# Patient Record
Sex: FEMALE | Race: WHITE | Employment: STUDENT | URBAN - METROPOLITAN AREA
[De-identification: names, ages, dates, MRNs, and addresses within clinical notes are randomized per-mention and may not be internally consistent; named-entity substitution may affect disease eponyms.]

---
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
## 2023-10-02 ENCOUNTER — TELEPHONE (OUTPATIENT)
Facility: CLINIC | Age: 23
End: 2023-10-02

## 2023-10-02 NOTE — TELEPHONE ENCOUNTER
The patient's father and his partner (Al DESIDanny, a patient of mine) have requested a GI visit for this patient. GI RNs: Please contact the patient. I would be happy to see her on Monday, October 9 at 3:30 PM at [facility]

## 2023-10-03 NOTE — TELEPHONE ENCOUNTER
Received a call back from the patient, /name verified. She agreed to the OV schedule provided.     10/9/2023 at 3:30 pm

## 2023-10-04 NOTE — TELEPHONE ENCOUNTER
Called and spoke to the patient, /name verified. The patient will come at 3:20 pm on 10/9/2023. Your appointments       Date & Time Appointment Department [facility])    Oct 09, 2023  3:20 PM CDT Consult with Susan Vincent MD [address], [address] [facility],[address], [place] ([facility])    Please bring in any pertinent lab or diagnostic test results with you to your appointment.             Peg Trimble, [address]  [address] [place] [address],[address]  [phone]

## 2023-10-09 ENCOUNTER — OFFICE VISIT (OUTPATIENT)
Facility: CLINIC | Age: 23
End: 2023-10-09

## 2023-10-09 VITALS
SYSTOLIC BLOOD PRESSURE: 115 MMHG | HEIGHT: 64 IN | HEART RATE: 87 BPM | WEIGHT: 145 LBS | BODY MASS INDEX: 24.75 KG/M2 | DIASTOLIC BLOOD PRESSURE: 68 MMHG

## 2023-10-09 DIAGNOSIS — R10.84 GENERALIZED ABDOMINAL PAIN: ICD-10-CM

## 2023-10-09 DIAGNOSIS — R19.7 DIARRHEA, UNSPECIFIED TYPE: Primary | ICD-10-CM

## 2023-10-09 DIAGNOSIS — A04.8 H. PYLORI INFECTION: ICD-10-CM

## 2023-10-09 PROCEDURE — 3078F DIAST BP <80 MM HG: CPT | Performed by: INTERNAL MEDICINE

## 2023-10-09 PROCEDURE — 3008F BODY MASS INDEX DOCD: CPT | Performed by: INTERNAL MEDICINE

## 2023-10-09 PROCEDURE — 99204 OFFICE O/P NEW MOD 45 MIN: CPT | Performed by: INTERNAL MEDICINE

## 2023-10-09 PROCEDURE — 3074F SYST BP LT 130 MM HG: CPT | Performed by: INTERNAL MEDICINE

## 2023-10-09 RX ORDER — LEVOCETIRIZINE DIHYDROCHLORIDE 5 MG/1
TABLET, FILM COATED ORAL
COMMUNITY

## 2023-10-09 RX ORDER — NORETHINDRONE ACETATE AND ETHINYL ESTRADIOL, ETHINYL ESTRADIOL AND FERROUS FUMARATE 1MG-10(24)
1 KIT ORAL DAILY
COMMUNITY
Start: 2023-09-18

## 2023-10-09 RX ORDER — SPIRONOLACTONE 100 MG/1
100 TABLET, FILM COATED ORAL DAILY
COMMUNITY
Start: 2023-06-15

## 2023-10-10 NOTE — PATIENT INSTRUCTIONS
1.  Schedule CT enterography. 2.  H. pylori breath test at your convenience. 3.  Discontinue sugarless gum for 1 week and assess symptoms.

## 2023-10-10 NOTE — PROGRESS NOTES
Subjective:   Patient ID: Harsha Gambino is a 26year old female. KAHLIL Lane is self referred on the advice of her father's partner (Trino Coffman. ..). She is seen today with her mother. Scott Randhawa has states that she was entirely asymptomatic with the exception of fecal urgency (normal stools once daily) which was controllable until 2023 when while living in [place] and subsequently traveling to [place] she awoke with the sudden onset of severe abdominal pain and diarrhea. She was treated with an antibiotic along with a probiotic for 2 weeks. Symptoms initially improved but recurred in May. Since that time the patient has ongoing diarrhea consisting of 8-9 stools daily that are typically soft or watery. There is associated abdominal pressure which can make her \"cry\" at night. The symptoms can awaken her from sleep. Fecal urgency continues and now is almost immediate resulting in episodes of incontinence. The patient was evaluated [place] Juanito Colbert [place], [place] [facility] She underwent a colonoscopy in 2023 which was normal to the terminal ileum with the exception of a cobblestone appearance to the rectum that was felt to be preparation induced. Random biopsies of the colon and biopsies the rectum were unremarkable. Dr. Guadalupe Carlos felt that her symptoms were due to anxiety and IBS and recommended pharmacologic therapy. The patient has subsequently seen Valerie Do DC a functional medicine physician. She has had the following pertinent evaluation:    1. Negative GI PCR panel including C. difficile  2. Positive H. pylori antigen test of the stool. 3.  Microbiome analysis  4. Normal fecal calprotectin (9) (less [place] 30 [place] [facility] [place]  5. Normal fecal elastase (750)  6. Negative stool for occult blood  7. Negative antigliadin IgA, however, the patient's secretory IgA level was low at 320    Additional testin. Normal CMP  2. Normal CBC (elevated RBC count)  3. Negative COMPA  4. ESR 2    The patient was treated for the H. pylori. A tetracycline regimen was initially used but unable to be tolerated (took for 10 days). She was subsequently treated with a 2-week course of amoxicillin, omeprazole and metronidazole at the end of July. This did not influence her symptoms. She is currently taking Pyloricil. Confirmation of eradication has not been obtained. Suraj Triplett continues to experience mid to lower abdominal pain/pressure most of the time, worse at certain times such as nighttime which can \"make her cry\". Bowel movements continue as described above. She notes \"insane\" bloating, frequent heartburn described as burning in the chest.  Mylanta will help for approximately 10 minutes. She also feels that the right side of her body is very sensitive and that overall she feels inflamed. Her mother feels that she \"looks different\" and \"bloated\". The patient has frequent nausea. She can vomit a few times monthly which can occur immediately after eating. She describes globus but no dysphagia. Heartburn occurs in the evenings. She also notes visible bloating and provides a cell phone photograph to document this. She notes that her stools can be black or red in color. There are no definite dietary precipitating factors. Of note the patient chews large quantities of sugarless gum which she has done so chronically. Past medical history:  1. Recurrent sinusitis  2. Adenoidectomy    Medications:  Oral contraceptives  Supplements  Spironolactone x3 years for acne  Prebiotic  Pyloricil  Vitamin B12  No frequent NSAIDs  One quarter of an Imodium tablet will result in being \"stopped up and bloated like death\". The following day diarrhea will resume.   The patient has not taken antispasmodics or a longer term PPI course    Social history:  Non-smoker  No alcohol  No marijuana  Occasional caffeine    Family history:  Negative for gastrointestinal syndromes or cancers      History/Other:   Review of Systems  See above    Wt Readings from Last 7 Encounters:  10/09/23 : 145 lb (65.8 kg)  08/03/20 : 141 lb (64 kg) (73%, Z= 0.63)*  04/25/19 : 140 lb (63.5 kg) (76%, Z= 0.72)*  02/18/19 : 135 lb (61.2 kg) (71%, Z= 0.55)*  12/04/17 : 144 lb (65.3 kg) (83%, Z= 0.96)*  05/02/17 : 145 lb (65.8 kg) (85%, Z= 1.04)*    * Growth percentiles are based on [facility] (Girls, 2-20 Years) data. Current Outpatient Medications   Medication Sig Dispense Refill    AZELASTINE HCL NA azelastine 137 mcg (0.1 %) nasal spray aerosol   U 1 SPR NSALLY BID      levocetirizine 5 MG Oral Tab levocetirizine 5 mg tablet   TAKE 1 TABLET BY MOUTH EVERY NIGHT AT BEDTIME      LO LOESTRIN FE 1 MG-10 MCG / 10 MCG Oral Tab Take 1 tablet by mouth daily. spironolactone 100 MG Oral Tab Take 1 tablet (100 mg total) by mouth daily. Desogestrel-Ethinyl Estradiol (VIORELE) 0.15-0.02/0.01 MG (21/5) Oral Tab Take 1 tablet by mouth daily. 84 tablet 3    Probiotic Product (PROBIOTIC OR) Take by mouth as needed. Acetaminophen (MIDOL OR) Take by mouth as needed. (Patient not taking: Reported on 10/9/2023)      IBUPROFEN OR as needed. (Patient not taking: Reported on 10/9/2023)       Allergies:  Charentais Melon (F*    SWELLING  Honeydew                TONGUE SWELLING  Red Dye                 TONGUE SWELLING  Ibuprofen               UNKNOWN  Olive Oil               UNKNOWN    Comment:Black olives  Dust Mite Extract       OTHER (SEE COMMENTS), ITCHING    Objective:   Physical Exam  Vitals and nursing note reviewed. Constitutional:       General: She is not in acute distress. Appearance: She is well-developed. She is not ill-appearing, toxic-appearing or diaphoretic. Comments: Appears healthy   HENT:      Head: Normocephalic and atraumatic. Mouth/Throat:      Pharynx: No oropharyngeal exudate. Eyes:      General: No scleral icterus.      Conjunctiva/sclera: Conjunctivae normal.   Neck:      Thyroid: No thyromegaly. Cardiovascular:      Rate and Rhythm: Normal rate and regular rhythm. Heart sounds: Normal heart sounds. Pulmonary:      Effort: Pulmonary effort is normal. No respiratory distress. Breath sounds: Normal breath sounds. No wheezing or rales. Abdominal:      General: Bowel sounds are normal. There is no distension. Palpations: Abdomen is soft. There is no mass. Tenderness: There is abdominal tenderness (Diffuse direct tenderness to palpation). There is no guarding or rebound. Musculoskeletal:      Cervical back: Neck supple. Lymphadenopathy:      Cervical: No cervical adenopathy. Neurological:      Mental Status: She is alert and oriented to person, place, and time. Psychiatric:         Behavior: Behavior normal.         Assessment & Plan:   1. Diarrhea, unspecified type  The patient presents with the abrupt onset of abdominal pain and diarrhea in February 2023 while in [place]. Work-up to date has included a negative GI PCR panel and a normal ileocolonoscopy. Fecal calprotectin determinations have been normal as has a fecal elastase. Sprue serology was negative, however, the patient's IgA level is low. We discussed other possibilities. 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 There may be a component of sorbitol induced symptoms as well. Small intestinal bacterial overgrowth is also a possibility although the patient did not respond to antibiotics. The patient will be contacted regarding obtaining sprue serology (IgA, IgG and a TTG IgG antibody). She will stop the sorbitol containing gum for 1 week and assess symptoms. In light of the refractory symptoms I am recommending that we obtain an enterography. We discussed CT versus MRI and have elected the former. We also discussed the option of an upper endoscopy and small bowel biopsy.   This would be mandatory if sprue serology is abnormal.  If the above work-up is negative options include an antispasmodic or a low-dose tricyclic. I would not use Viberzi or Lotronex as very low doses of Imodium resulted in constipation. - CT ENTEROGRAPHY ABD/PEL W CONTRAST (CPT=74177); Future    2. Generalized abdominal pain  See above    - CT ENTEROGRAPHY ABD/PEL W CONTRAST (CPT=74177); Future    3. H. pylori infection  Eradication should be confirmed. We discussed antigen testing versus breath testing and have elected the latter. - Helicobacter Pylori Breath Test, Adult;  Future    Orders Placed This Encounter      Helicobacter Pylori Breath Test, Adult      Tissue Transglutaminase AB IgG      Tissue Transglutaminase Ab, IgA      Immunoglobulin A/G/M, Quant      Meds This Visit:  Requested Prescriptions      No prescriptions requested or ordered in this encounter       Imaging & Referrals:  CT ENTEROGRAPHY ABD/PEL W CONTRAST (CPT=74177)

## 2023-10-23 ENCOUNTER — PATIENT MESSAGE (OUTPATIENT)
Facility: CLINIC | Age: 23
End: 2023-10-23

## 2023-10-23 ENCOUNTER — TELEPHONE (OUTPATIENT)
Facility: CLINIC | Age: 23
End: 2023-10-23

## 2023-10-23 NOTE — TELEPHONE ENCOUNTER
From: Hakeem Singer  To: Ciera Tan  Sent: 10/23/2023 12:59 PM CDT  Subject: Blood in stool    Hi Dr. Capri Trivedi,     This past weekend I have noticed large clots of blood in my stool which is something that I have not experienced before. Normally it looks like small pieces of blood in my stool but this time it was larger clots. I just wanted to let you know this and I wanted to know if this was something to be more concerned about.

## 2023-10-25 ENCOUNTER — HOSPITAL ENCOUNTER (OUTPATIENT)
Dept: CT IMAGING | Facility: HOSPITAL | Age: 23
Discharge: HOME | End: 2023-10-25
Attending: INTERNAL MEDICINE

## 2023-10-25 DIAGNOSIS — R10.84 GENERALIZED ABDOMINAL PAIN: ICD-10-CM

## 2023-10-25 DIAGNOSIS — R19.7 DIARRHEA, UNSPECIFIED TYPE: ICD-10-CM

## 2023-10-25 LAB
CREAT BLD-MCNC: 0.7 MG/DL
EGFRCR SERPLBLD CKD-EPI 2021: 125 ML/MIN/1.73M2 (ref 60–?)

## 2023-10-25 PROCEDURE — 82565 ASSAY OF CREATININE: CPT

## 2023-10-25 PROCEDURE — 74177 CT ABD & PELVIS W/CONTRAST: CPT | Performed by: INTERNAL MEDICINE

## 2023-10-26 ENCOUNTER — PATIENT MESSAGE (OUTPATIENT)
Facility: CLINIC | Age: 23
End: 2023-10-26

## 2023-10-27 ENCOUNTER — TELEPHONE (OUTPATIENT)
Facility: CLINIC | Age: 23
End: 2023-10-27

## 2023-10-27 NOTE — TELEPHONE ENCOUNTER
Patient calling regards states would want to know what Dr recommendations aware based on CT results before she leaves 4 hrs away for school today. Please call and advise.

## 2023-10-27 NOTE — TELEPHONE ENCOUNTER
From: Heather Reyes  To: Bisi Roperman  Sent: 10/26/2023 9:23 PM CDT  Subject: Follow up     Hi there,   I was planning on heading back to school tomorrow but I wanted to check with Dr. Raul Pinto before heading back in case he wanted to see me after my CT results.

## 2023-10-27 NOTE — TELEPHONE ENCOUNTER
Dr Tiffanie Penny and spoke to the patient, /name verified. CT enterography completed 10/25/2023. As per the patient, she is doing ok. She wants Ct results reviewed w/ her. She was informed that you are out of the office, returning 2023    The patient understood.

## 2023-11-12 ENCOUNTER — PATIENT MESSAGE (OUTPATIENT)
Facility: CLINIC | Age: 23
End: 2023-11-12

## 2023-11-12 DIAGNOSIS — R93.2 ABNORMAL CT OF LIVER: Primary | ICD-10-CM

## 2023-11-12 DIAGNOSIS — R19.7 DIARRHEA, UNSPECIFIED TYPE: ICD-10-CM

## 2023-11-13 NOTE — TELEPHONE ENCOUNTER
Dr Hayden Vargas enterography resulted, available in [facility] Please see MyChart message from the patient.     Thank you

## 2023-11-14 NOTE — TELEPHONE ENCOUNTER
Please see 11/12/2023 [employer] message with documentation from 11/13/2023: I spoke to Aixa So King Medhat. Fortunately the CT scan was negative for inflammatory bowel disease. Her GI symptoms continue albeit slightly better on a probiotic and prebiotic with decreased stool frequency to 4-5 daily. The abnormality in the liver is likely benign, however, I would recommend a liver ultrasound as the next step which was ordered. She will obtain the sprue serology when able. She is concerned about the possibility of liver flukes based on her GI symptoms and the abnormality on CT. She has traveled abroad. Although this would be unlikely I am recommending an ova and parasite examination which was ordered as well. Further recommendations pending the above results.

## 2023-11-14 NOTE — TELEPHONE ENCOUNTER
I spoke to [address] [facility]. Fortunately the CT scan was negative for inflammatory bowel disease. Her GI symptoms continue albeit slightly better on a probiotic and prebiotic with decreased stool frequency to 4-5 daily. The abnormality in the liver is likely benign, however, I would recommend a liver ultrasound as the next step which was ordered. She will obtain the sprue serology when able. She is concerned about the possibility of liver flukes based on her GI symptoms and the abnormality on CT. She has traveled abroad. Although this would be unlikely I am recommending an ova and parasite examination which was ordered as well. Further recommendations pending the above results.

## 2023-11-21 ENCOUNTER — PATIENT MESSAGE (OUTPATIENT)
Facility: CLINIC | Age: 23
End: 2023-11-21

## 2023-11-22 NOTE — TELEPHONE ENCOUNTER
Paul Bonilla    Please advise on message below. Unable to advise to take 2 imodium, easily constipated.     Thank you

## 2023-11-22 NOTE — TELEPHONE ENCOUNTER
From: Carisa Valdez  To: Iván Dela Cruz  Sent: 11/21/2023 11:01 PM CST  Subject: medicine     Hi Dr. Lauryn Pinto! 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 I was wondering if there was a super strength anti diarrhea medicine that would help or if imodium is the best. Let me know what you think!

## 2023-11-28 ENCOUNTER — PATIENT MESSAGE (OUTPATIENT)
Facility: CLINIC | Age: 23
End: 2023-11-28

## 2023-11-29 ENCOUNTER — TELEPHONE (OUTPATIENT)
Facility: CLINIC | Age: 23
End: 2023-11-29

## 2023-11-29 NOTE — TELEPHONE ENCOUNTER
From: Constance Shaikh  To: Eleanor Colbert  Sent: 11/28/2023 2:13 PM CST  Subject: Ultrasound     Hi there! I was wondering if was possible for me to do the ultrasound at another location. [facility] is booked except for a few appointments that are at 7am which would be hard for me since my stomach problems are so bad in the morning. I was hoping maybe I could try other locations that would have an appointment later in the day.

## 2023-11-29 NOTE — TELEPHONE ENCOUNTER
I called and spoke to the patient, /name verified. She is unable to schedule for the US liver, available schedule is towards the end of 2023. I advised the patient to schedule and GI will call [address].

## 2023-12-04 ENCOUNTER — TELEPHONE (OUTPATIENT)
Facility: CLINIC | Age: 23
End: 2023-12-04

## 2023-12-04 NOTE — TELEPHONE ENCOUNTER
1st,overdue reminder letter mailed out to patient   Labs and Imaging order :  Helicobacter Pylori Breath Test, Adult  Immunoglobulin A/G/M, Quant  Tissue Transglutaminase AB IgG  Tissue Transglutaminase Ab, IgA  Ova and Parasites, traveler outside of country (Order #[id number]) on 11/13/23     Imaging order:  US LIVER (CPT=76705) (Order #[id number]) on 11/13/23

## 2023-12-11 ENCOUNTER — NURSE ONLY (OUTPATIENT)
Dept: LAB | Facility: REFERENCE LAB | Age: 23
End: 2023-12-11
Attending: INTERNAL MEDICINE
Payer: COMMERCIAL

## 2023-12-11 DIAGNOSIS — A04.8 H. PYLORI INFECTION: ICD-10-CM

## 2023-12-11 PROCEDURE — 83013 H PYLORI (C-13) BREATH: CPT

## 2023-12-12 LAB — H PYLORI BREATH TEST: NEGATIVE

## 2023-12-13 NOTE — TELEPHONE ENCOUNTER
Called and spoke to the patient, /name verified. She would like to keep the ultrasound schedule. I notified her of the H pylori result which was negative. The patient was grateful.

## 2023-12-21 ENCOUNTER — HOSPITAL ENCOUNTER (OUTPATIENT)
Dept: ULTRASOUND IMAGING | Age: 23
Discharge: HOME | End: 2023-12-21
Attending: INTERNAL MEDICINE
Payer: COMMERCIAL

## 2023-12-21 DIAGNOSIS — R93.2 ABNORMAL CT OF LIVER: ICD-10-CM

## 2023-12-21 PROCEDURE — 76705 ECHO EXAM OF ABDOMEN: CPT | Performed by: INTERNAL MEDICINE

## 2023-12-22 ENCOUNTER — TELEPHONE (OUTPATIENT)
Facility: CLINIC | Age: 23
End: 2023-12-22

## 2024-01-03 ENCOUNTER — PATIENT MESSAGE (OUTPATIENT)
Facility: CLINIC | Age: 24
End: 2024-01-03

## 2024-01-03 ENCOUNTER — TELEPHONE (OUTPATIENT)
Facility: CLINIC | Age: 24
End: 2024-01-03

## 2024-01-03 DIAGNOSIS — K62.5 RECTAL BLEEDING: Primary | ICD-10-CM

## 2024-01-03 NOTE — TELEPHONE ENCOUNTER
Pt states that she is still getting blood in stool.  Stools have been very dark.  Pt is going to lab test tomorrow and is wanting to know if anything needs to be added to this order.  Please call

## 2024-01-03 NOTE — TELEPHONE ENCOUNTER
Yes, I would obtain a CBC and iron studies.  Please make sure that the patient instructs the lab to look up all outstanding orders as there will be 2 orders to be done (the orders entered today and the sprue serology entered previously).

## 2024-01-03 NOTE — TELEPHONE ENCOUNTER
Dr Myers    Called and spoke to the patient, /name verified.    She stated she will go to the lab tomorrow to complete the tests.    She is concerned, Hgb might be dropping. She reported approximately 10 separate episodes of small dark red blood clots mixed w/ stool. She denies toilet bowl turning red due to blood.    She has allergy to red dye, does not take anything red.  No menstrual period.  She feels tired easily, denies dizziness, fever and sob.    Appetite fair.    Advised the patient if bleeding is sever, she feels weak, dizzy and sob, she needs to be evaluated in ED.  The patient verbalized understanding.    Thank you

## 2024-01-03 NOTE — TELEPHONE ENCOUNTER
Called and spoke to the patient, / name verified.    The patient was informed of total 5 blood tests to be completed and 1 stool test.    She verbalized understanding.

## 2024-01-04 ENCOUNTER — LAB ENCOUNTER (OUTPATIENT)
Dept: LAB | Age: 24
End: 2024-01-04
Attending: INTERNAL MEDICINE
Payer: COMMERCIAL

## 2024-01-04 DIAGNOSIS — R19.7 DIARRHEA, UNSPECIFIED TYPE: ICD-10-CM

## 2024-01-04 DIAGNOSIS — K62.5 RECTAL BLEEDING: ICD-10-CM

## 2024-01-04 LAB
BASOPHILS # BLD AUTO: 0.06 X10(3) UL (ref 0–0.2)
BASOPHILS NFR BLD AUTO: 0.7 %
DEPRECATED HBV CORE AB SER IA-ACNC: 7.1 NG/ML
DEPRECATED RDW RBC AUTO: 41.6 FL (ref 35.1–46.3)
EOSINOPHIL # BLD AUTO: 0.1 X10(3) UL (ref 0–0.7)
EOSINOPHIL NFR BLD AUTO: 1.2 %
ERYTHROCYTE [DISTWIDTH] IN BLOOD BY AUTOMATED COUNT: 13.3 % (ref 11–15)
HCT VFR BLD AUTO: 44.3 %
HGB BLD-MCNC: 13.9 G/DL
IGA SERPL-MCNC: 152.6 MG/DL (ref 40–350)
IGM SERPL-MCNC: 131.2 MG/DL (ref 50–300)
IMM GRANULOCYTES # BLD AUTO: 0.02 X10(3) UL (ref 0–1)
IMM GRANULOCYTES NFR BLD: 0.2 %
IMMUNOGLOBULIN PNL SER-MCNC: 946 MG/DL (ref 650–1600)
IRON SATN MFR SERPL: 28 %
IRON SERPL-MCNC: 132 UG/DL
LYMPHOCYTES # BLD AUTO: 2.28 X10(3) UL (ref 1–4)
LYMPHOCYTES NFR BLD AUTO: 27.2 %
MCH RBC QN AUTO: 27 PG (ref 26–34)
MCHC RBC AUTO-ENTMCNC: 31.4 G/DL (ref 31–37)
MCV RBC AUTO: 86.2 FL
MONOCYTES # BLD AUTO: 0.57 X10(3) UL (ref 0.1–1)
MONOCYTES NFR BLD AUTO: 6.8 %
NEUTROPHILS # BLD AUTO: 5.34 X10 (3) UL (ref 1.5–7.7)
NEUTROPHILS # BLD AUTO: 5.34 X10(3) UL (ref 1.5–7.7)
NEUTROPHILS NFR BLD AUTO: 63.9 %
PLATELET # BLD AUTO: 283 10(3)UL (ref 150–450)
RBC # BLD AUTO: 5.14 X10(6)UL
TIBC SERPL-MCNC: 472 UG/DL (ref 250–425)
TRANSFERRIN SERPL-MCNC: 317 MG/DL (ref 250–380)
WBC # BLD AUTO: 8.4 X10(3) UL (ref 4–11)

## 2024-01-04 PROCEDURE — 85025 COMPLETE CBC W/AUTO DIFF WBC: CPT

## 2024-01-04 PROCEDURE — 84466 ASSAY OF TRANSFERRIN: CPT

## 2024-01-04 PROCEDURE — 87177 OVA AND PARASITES SMEARS: CPT

## 2024-01-04 PROCEDURE — 86364 TISS TRNSGLTMNASE EA IG CLAS: CPT

## 2024-01-04 PROCEDURE — 82728 ASSAY OF FERRITIN: CPT

## 2024-01-04 PROCEDURE — 83540 ASSAY OF IRON: CPT

## 2024-01-04 PROCEDURE — 87209 SMEAR COMPLEX STAIN: CPT

## 2024-01-04 PROCEDURE — 82784 ASSAY IGA/IGD/IGG/IGM EACH: CPT

## 2024-01-04 PROCEDURE — 36415 COLL VENOUS BLD VENIPUNCTURE: CPT

## 2024-01-04 NOTE — TELEPHONE ENCOUNTER
From: Ariana Gamboa  To: Louis Burrell  Sent: 1/3/2024 4:57 PM CST  Subject: Lab work     here is the lab work

## 2024-01-05 ENCOUNTER — PATIENT MESSAGE (OUTPATIENT)
Facility: CLINIC | Age: 24
End: 2024-01-05

## 2024-01-05 LAB
TTG IGA SER-ACNC: 0.3 U/ML (ref ?–7)
TTG IGG SER-ACNC: <0.6 U/ML (ref ?–7)

## 2024-01-05 NOTE — TELEPHONE ENCOUNTER
From: Ariana Gamboa  To: Louis Burrell  Sent: 1/5/2024 12:43 PM CST  Subject: Bloodwork    I wanted to let Dr. Burrell know that I got my bloodwork done yesterday and my stool sample. I am heading back to school on Sunday so just have Dr. Burrell call me if he needs to see me.

## 2024-01-06 NOTE — TELEPHONE ENCOUNTER
I spoke to Ariana.  She is feeling significantly better with regards to her diarrhea.  She now has #5 stools daily as opposed to 10 or more.  Abdominal bloating and pressure have not completely resolved but are better.  She has been experiencing intermittent rectal bleeding both red and dark in the stool.  Her hemoglobin fortunately is normal.  Iron saturation is normal, however, ferritin is 7.1.  She is on daily OCPs and has not had a menstrual period for 3 years.  Sprue serology was negative and a previously low IgA level has normalized.  She has had a colonoscopy within the past several months along with normal fecal calprotectin determinations.  Although I doubt inflammatory bowel disease she will monitor and chart her bleeding over the next 2 weeks.  She will be traveling to  but can come back if needed.  She will send me a MyChart message with the aforementioned diary.  If the bleeding episodes continue we will contemplate either a flexible sigmoidoscopy or repeat colonoscopy.  She does not feel that she requires antispasmodics at this time.  She was very appreciative of the call.

## 2024-02-09 ENCOUNTER — PATIENT MESSAGE (OUTPATIENT)
Facility: CLINIC | Age: 24
End: 2024-02-09

## 2024-02-09 NOTE — TELEPHONE ENCOUNTER
From: Ariana Gamboa  To: Louis Burrell  Sent: 2/9/2024 10:12 AM CST  Subject: Blood in Stool    Hi Doctor Indra.     Since our last call, I have been monitoring blood in my stool. I wouldn’t say that it happens every time I have a bowl movement but it definitely is happening often. I have also noticed it a lot when I wipe, but it is mostly dark and in my stool.

## 2024-02-09 NOTE — TELEPHONE ENCOUNTER
Dr Myers    The patient sent a diary of bloody stools.    Please see attachment from the [employer] message    Thank you

## 2024-02-09 NOTE — TELEPHONE ENCOUNTER
Called and spoke to the patient, /name verified.    She will send in the MyChart her diary of the bleeding.

## 2024-02-12 ENCOUNTER — TELEPHONE (OUTPATIENT)
Facility: CLINIC | Age: 24
End: 2024-02-12

## 2024-02-12 DIAGNOSIS — K62.5 RECTAL BLEEDING: Primary | ICD-10-CM

## 2024-02-12 RX ORDER — DEXTROAMPHETAMINE SACCHARATE, AMPHETAMINE ASPARTATE, DEXTROAMPHETAMINE SULFATE AND AMPHETAMINE SULFATE 2.5; 2.5; 2.5; 2.5 MG/1; MG/1; MG/1; MG/1
10 TABLET ORAL AS NEEDED
COMMUNITY

## 2024-02-12 NOTE — TELEPHONE ENCOUNTER
May schedule a flexible sigmoidoscopy for rectal bleeding with monitored anesthesia care or IV sedation.  Alyssaets enema preparation (at least #1).

## 2024-02-12 NOTE — TELEPHONE ENCOUNTER
Dr Louis HOWARD [facility] Clinical Staff (supporting Louis Burrell MD)3 days ago     MK  I definitely don’t want to get a colonoscopy unless it’s necessary so I think the other option is definitely better, it’s better to be safe then sorry I guess!       MD Ariana Barrosos3 days ago       Paul Lane,     Thank you for the update.     I doubt that the bleeding is due to a serious cause.  Bleeding from internal hemorrhoids is most likely, however, I cannot exclude inflammation in the colon or rectum.     We could consider a flexible sigmoidoscopy (shorter version of a colonoscopy without the extensive bowel preparation) to evaluate for any inflammation in the lower portion of the colon.  This would be performed with sedation.  I would like to avoid another colonoscopy if possible although this would be an additional option if you prefer.     Please let me know how you wish to proceed.     Dr. Burrell    Medications, pharmacy, and allergies verified with the patient.   Please advise on colonoscopy and bowel prep orders.     › Insurance:  [employer]  › Last PCP Visit:  › Last CBC drawn: 1/4/2024  › H/W/BMI:  5'4/ 150 lbs/ 25.74    Special comments/notes:    Telephone colon screening Questionnaires:  Yes No   Are you currently experiencing any new GI symptoms? [x] []   If yes, symptom details:     Rectal Bleeding with or without bowel movements: [x] []   Black stool: [] [x]   Dysphagia &Food feeling/getting stuck: [] [x]   Intractable Vomiting: [] [x]   Unexplained weight loss: [] [x]   Any issues with anesthesia? [] [x]   If yes, explain details:      Personal history of Resp. Issues/Oxygen Use/TERRY/COPD? [] [x]   If yes, CPAP/BiPAP? [] []   History of devices Pacemaker/Defibrillator/Stents? [] [x]   History of Cardiac/CVA issues/(MI/Stroke):  [] [x]     Medication usage:  Yes  No   Anticoagulants:  Anticoagulant (Except Aspirin) ? Route to RN staff to obtain ordering provider  orders [] [x]   Diabetic Meds:   PO DM Meds ? Hold day prior and day of procedure  Insulin ? Route to RN clinical staff to obtain provider orders  [] [x]   Weight loss meds (phentermine/Vyvanse/Saxsenda): [] [x]   Iron/Herbal/Multivitamin Supplement (RX/OTC): [] [x]   Usage of marijuana, CBD &/or vape products: [] [x]

## 2024-02-14 NOTE — TELEPHONE ENCOUNTER
Dr. Burrell-      Spoke with patient she goes to school in [place] and her only time she will be around is the week of march 10th. She is wondering if possible to see another provider- I did mention I  can schedule her in May after she is done with school and put her on your wait list as well if another provider won't work.    Please advise    Thanks!

## 2024-02-15 NOTE — TELEPHONE ENCOUNTER
Scheduled for:  [employer] Sig [address]  Provider Name:  Dr. Wong  Date:  3/15/2024  Location:  [facility]  Sedation:  MAC  Time:  2:45pm, pt is aware [facility] will call with arrival time  Prep:  Fleets  Meds/Allergies Reconciled?:  Physician reviewed     Diagnosis with codes:  rectal bleeding K62.5  Was patient informed to call insurance with codes (Y/N):  Yes, , I confirmed [employer] insurance with this patient.      Referral sent?:  Referral was sent at the time of electronic surgical scheduling.   [facility] or [facility] notified?:  I sent an electronic request to [facility] and received a confirmation today.      Medication Orders:  n/a  Misc Orders:  n/a     Further instructions given by staff:   I discussed the prep instructions with the patient which he verbally understood and is aware that I will mail the instructions today.

## 2024-03-15 ENCOUNTER — TELEPHONE (OUTPATIENT)
Facility: CLINIC | Age: 24
End: 2024-03-15

## 2024-03-15 NOTE — TELEPHONE ENCOUNTER
Received form signed by MD.    Faxed order to [facility] with face sheet, insurance info.    Signed form sent for scanning    Addendum to my charting below:  I asked the patient to call the office if she does not receive a call from the pharmacy.    She agreed and verbalized understanding.

## 2024-03-15 NOTE — TELEPHONE ENCOUNTER
GI RNs: Can you send in a prescription to the patient's pharmacy for topical nifedipine (compounded into gel form) 0.2%.  Sig: Apply a pea-sized quantity to the anus twice daily.  Dispense 1 month with 1 refill.

## 2024-03-15 NOTE — TELEPHONE ENCOUNTER
Dr Myers    Called and spoke to the patient, /name verified.    She agreed to send the nifedipine order to [facility] pharmacy in [place] as a mail order.    Form filled out, please review and sign.    Thank you

## 2024-04-15 ENCOUNTER — TELEPHONE (OUTPATIENT)
Facility: CLINIC | Age: 24
End: 2024-04-15

## 2024-04-15 NOTE — TELEPHONE ENCOUNTER
Patient calling states has questions regards parasites in stool/. States possibly might have infection feels as if she saw in her stool. Please call.

## 2024-04-15 NOTE — TELEPHONE ENCOUNTER
Dr Louis ENCARNACION  Called and spoke to the patient, /name verified.    She stated she noticed 4 whitish strands in her stools x 1, she could not tell if it was moving.    She denies fever, c/o diarrhea with mucus in stool, bloating.    Advised to monitor stools and let the office know if it recurs.    The patient agreed and verbalized understanding    Thank you.

## 2024-04-15 NOTE — TELEPHONE ENCOUNTER
[facility]    Mental Health Counseling Session Update - Protected Health Information        Date:  1-23-24       Virtual telehealth session with Ms. Chapa.       Total  Time:    60 min                            Progress:  Good       / Fair                 Compliant with Psych. Meds:  Yes      Therapeutic Modality:  Supportive    DBT             Factors regarding Medical treatment:   Multiple medical/ physical concerns/ limitations.              Changes (if any) to Initial Assessment:                      Treatment Modality:  Individual    Suicidal/ Homicidal Concerns:  No    Factors aiding or impeding psychotherapy progress:   came     Processed client's thoughts, feelings, and actions.  Focused on distress tolerance and interpersonal effectiveness.  Examined thought, behavioral, emotional, and relationship patterns.  Explored ideas, choices, resources, and decision-making.  Encouraged utilizing mindfulness and grounding techniques.  Provided support and empathetic understanding.            Next Sessions:  1-26-24  & 1-30-24     Kera Landry [facility]  Mental Health Counselor               Agree with triage/RN advice.

## 2024-06-03 ENCOUNTER — PATIENT MESSAGE (OUTPATIENT)
Facility: CLINIC | Age: 24
End: 2024-06-03

## 2024-06-03 DIAGNOSIS — R19.7 DIARRHEA, UNSPECIFIED TYPE: ICD-10-CM

## 2024-06-03 DIAGNOSIS — R14.0 BLOATING: Primary | ICD-10-CM

## 2024-06-03 DIAGNOSIS — R14.0 ABDOMINAL DISTENTION: ICD-10-CM

## 2024-06-04 NOTE — TELEPHONE ENCOUNTER
Dr Myers    Called and spoke to the patient, date of birth and name verified.    She reported still having loose stools daily, bloated after eating and abdomen feels distended.    She wants to get tested for SIBO at [facility], declined [facility].    Referral pended, please sign if ok.    Thank you

## 2024-06-05 NOTE — TELEPHONE ENCOUNTER
Faxed referral for lactulose hydrogen breath test to [facility] ([phone] F) ([phone] P)    Also faxed face sheet, insurance information, office visit from 10/9/2023.

## 2024-10-30 ENCOUNTER — APPOINTMENT (OUTPATIENT)
Dept: URGENT CARE | Age: 24
End: 2024-10-30

## 2024-10-30 ENCOUNTER — HOSPITAL ENCOUNTER (OUTPATIENT)
Age: 24
Discharge: HOME | End: 2024-10-30
Payer: COMMERCIAL

## 2024-10-30 VITALS
DIASTOLIC BLOOD PRESSURE: 70 MMHG | RESPIRATION RATE: 20 BRPM | SYSTOLIC BLOOD PRESSURE: 130 MMHG | TEMPERATURE: 98 F | HEART RATE: 89 BPM | OXYGEN SATURATION: 100 %

## 2024-10-30 DIAGNOSIS — M54.32 SCIATICA OF LEFT SIDE: Primary | ICD-10-CM

## 2024-10-30 PROCEDURE — 99203 OFFICE O/P NEW LOW 30 MIN: CPT | Performed by: NURSE PRACTITIONER

## 2024-10-30 NOTE — ED INITIAL ASSESSMENT (HPI)
Pt came in due to left leg pain for the past 2 weeks. Pt denies any injury trauma or fall. Pt stated pain starts from left ankle and radiates up the entire left leg up to the buttock. Pt stated pain gets worse when laying down flat or sitting on the toilet. Pt has easy non labored respirations.

## 2024-10-30 NOTE — ED PROVIDER NOTES
Patient Seen in: [facility]      History     Chief Complaint   Patient presents with    Leg Pain     Stated Complaint: Leg or Foot Injury    Subjective:   24-year-old female with history of multiple chronic medical problems, including anemia and GI problems presents with c/o leg pain X 2 weeks.  She states the pain worse with laying down or sitting. No pain with walking. No recent travel. Pain start at left buttocks and radiates down to left ankle. No swelling, no bruising, no warmth. No recent travel, no surgeries. She is on oral birth control.     The history is provided by the patient.             Objective:     History reviewed. No pertinent past medical history.           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   Tobacco Use    Smoking status: Never    Smokeless tobacco: Never   Vaping Use    Vaping status: Never Used   Substance and Sexual Activity    Alcohol use: No    Drug use: No    Sexual activity: Not Currently   Other Topics Concern    Exercise Yes     Comment: yoga     Social Drivers of Health     Financial Resource Strain: Low Risk  (9/18/2024)    Received from [facility]    Overall Financial Resource Strain (CARDIA)     Difficulty of Paying Living Expenses: Not hard at all   Food Insecurity: No Food Insecurity (9/18/2024)    Received from [facility]    Hunger Vital Sign     Worried About Running Out of Food in the Last Year: Never true     Ran Out of Food in the Last Year: Never true   Transportation Needs: No Transportation Needs (9/18/2024)    Received from [facility]    PRAPARE - Transportation     Lack of Transportation (Medical): No     Lack of Transportation (Non-Medical): No   Housing  Stability: Unknown (9/18/2024)    Received from [facility]    Housing Stability Vital Sign     Unable to Pay for Housing in the Last Year: No              Review of Systems    Positive for stated complaint: Leg or Foot Injury  Other systems are as noted in HPI.  Constitutional and vital signs reviewed.      All other systems reviewed and negative except as noted above.    Physical Exam     ED Triage Vitals [10/30/24 1432]   /70   Pulse 89   Resp 20   Temp 97.7 °F (36.5 °C)   Temp src Temporal   SpO2 100 %   O2 Device None (Room air)       Current Vitals:   Vital Signs  BP: 130/70  Pulse: 89  Resp: 20  Temp: 97.7 °F (36.5 °C)  Temp src: Temporal    Oxygen Therapy  SpO2: 100 %  O2 Device: None (Room air)        Physical Exam  Constitutional:       Appearance: Normal appearance.   Cardiovascular:      Rate and Rhythm: Normal rate and regular rhythm.      Pulses: Normal pulses.      Heart sounds: Normal heart sounds.   Pulmonary:      Effort: Pulmonary effort is normal.      Breath sounds: Normal breath sounds.   Musculoskeletal:         General: Normal range of motion.      Lumbar back: Tenderness (Left gluteal region) present. Negative right straight leg raise test.   Skin:     General: Skin is warm and dry.   Neurological:      Mental Status: She is alert and oriented to person, place, and time.   Psychiatric:         Mood and Affect: Mood normal.         Behavior: Behavior normal.             ED Course   Labs Reviewed - No data to display                MDM              Medical Decision Making  24-year-old female presented with history and exam consistent with sciatica.  On exam she has no swelling, warmth, redness, or tenderness to left leg so I have low concern for DVT.  She also has low risk factors for DVT.  No recent travel, no recent surgeries, no history of DVT.  Patient is well-appearing and vitals are stable.  She is comfortable with plan of discharge.  Discussed strict return  precautions with her if she develops any redness, swelling, warmth to leg.  She will follow-up with her primary care provider.        Disposition and Plan     Clinical Impression:  1. Sciatica of left side         Disposition:  Discharge  10/30/2024  3:15 pm    Follow-up:  Andre Lafleur  [facility] [facility] [address]  [phone]    Schedule an appointment as soon as possible for a visit             Medications Prescribed:  Discharge Medication List as of 10/30/2024  3:18 PM              Supplementary Documentation:

## 2025-04-25 ENCOUNTER — HOSPITAL ENCOUNTER (OUTPATIENT)
Dept: MRI IMAGING | Facility: HOSPITAL | Age: 25
Discharge: HOME | End: 2025-04-25
Attending: FAMILY MEDICINE
Payer: COMMERCIAL

## 2025-04-25 DIAGNOSIS — K76.9 LIVER LESION: ICD-10-CM

## 2025-04-25 PROCEDURE — 76376 3D RENDER W/INTRP POSTPROCES: CPT | Performed by: FAMILY MEDICINE

## 2025-04-25 PROCEDURE — A9575 INJ GADOTERATE MEGLUMI 0.1ML: HCPCS | Performed by: RADIOLOGY

## 2025-04-25 PROCEDURE — 74183 MRI ABD W/O CNTR FLWD CNTR: CPT | Performed by: FAMILY MEDICINE

## 2025-04-25 RX ORDER — GADOTERATE MEGLUMINE 376.9 MG/ML
15 INJECTION INTRAVENOUS
Status: COMPLETED | OUTPATIENT
Start: 2025-04-25 | End: 2025-04-25

## 2025-04-25 RX ADMIN — GADOTERATE MEGLUMINE 15 ML: 376.9 INJECTION INTRAVENOUS at 21:21:00

## (undated) DEVICE — FORCEP BIOPSY RJ4 LG CAP W/ND

## (undated) DEVICE — 1200CC GUARDIAN II: Brand: GUARDIAN

## (undated) DEVICE — ENDOSCOPY PACK UPPER: Brand: MEDLINE INDUSTRIES, INC.

## (undated) DEVICE — Device: Brand: DEFENDO AIR/WATER/SUCTION AND BIOPSY VALVE

## (undated) DEVICE — 3M™ RED DOT™ MONITORING ELECTRODE WITH FOAM TAPE AND STICKY GEL, 50/BAG, 20/CASE, 72/PLT 2570: Brand: RED DOT™

## (undated) DEVICE — FILTERLINE NASAL ADULT O2/CO2

## (undated) NOTE — LETTER
Divya [address]  [place], [address] [facility]    Consent for Operation  Date: __________________                                Time: _______________    1.  I authorize the performance upon Miladys Nava the following operation:  Procedure(s): procedure has been videotaped, the surgeon will obtain the original videotape. The hospital will not be responsible for storage or maintenance of this tape.   7. For the purpose of advancing medical education, I consent to the admittance of observers to the STATEMENTS REQUIRING INSERTION OR COMPLETION WERE FILLED IN.     Signature of Patient:   ___________________________    When the patient is a minor or mentally incompetent to give consent:  Signature of person authorized to consent for patient: ____________ drugs/illegal medications). Failure to inform my anesthesiologist about these medicines may increase my risk of anesthetic complications. iv. If I am allergic to anything or have had a reaction to anesthesia before.   3. I understand how the anesthesia med I have discussed the procedure and information above with the patient (or patient’s representative) and answered their questions. The patient or their representative has agreed to have anesthesia services.     _______________________________________________

## (undated) NOTE — LETTER
12/4/2023              [address] [address]         Dear María Elena Vázquez records indicate that the tests ordered for you by Anthony Jacobs MD  have not been done. If you have, in fact, already completed the tests or you do not wish to have the tests done, please contact our office at [address] [place] [address]. Otherwise, please proceed with the testing. To schedule a test at any [facility], call [facility] at [phone], Monday through Friday between 7:30am to 6pm and on Saturday between 8am and 1pm.   Evening and weekend appointments for your exam are available.    Labs and Imaging order :  Helicobacter Pylori Breath Test, Adult  Immunoglobulin A/G/M, Quant  Tissue Transglutaminase AB IgG  Tissue Transglutaminase Ab, IgA  Ova and Parasites, traveler outside of country (Order #[id number]) on 11/13/23     Imaging order:  US LIVER (CPT=76705) (Order #[id number]) on 11/13/23         Sincerely,    Anthony Jacobs MD  [facility] [address]  [place] [id number] [phone]